# Patient Record
Sex: MALE | HISPANIC OR LATINO | Employment: FULL TIME | URBAN - METROPOLITAN AREA
[De-identification: names, ages, dates, MRNs, and addresses within clinical notes are randomized per-mention and may not be internally consistent; named-entity substitution may affect disease eponyms.]

---
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
## 2022-11-08 ENCOUNTER — OFFICE VISIT (OUTPATIENT)
Dept: DERMATOLOGY | Facility: IMAGING CENTER | Age: 22
End: 2022-11-08
Payer: COMMERCIAL

## 2022-11-08 VITALS — BODY MASS INDEX: 21.86 KG/M2 | WEIGHT: 148 LBS

## 2022-11-08 DIAGNOSIS — Z79.899 HIGH RISK MEDICATION USE: ICD-10-CM

## 2022-11-08 DIAGNOSIS — L70.0 ACNE VULGARIS: ICD-10-CM

## 2022-11-08 PROCEDURE — 99214 OFFICE O/P EST MOD 30 MIN: CPT | Performed by: NURSE PRACTITIONER

## 2022-11-08 NOTE — PROGRESS NOTES
DERMATOLOGY NOTE  FOLLOW UP VISIT       Chief complaint: Establish Care and Acne     Pt states he is having random breakouts and would like to restart accutane.    No Known Allergies     MEDICATIONS:  Medications relevant to specialty reviewed.     REVIEW OF SYSTEMS:   Positive for skin (see HPI)  Negative for fevers and chills       EXAM:  Wt 67.1 kg (148 lb)   BMI 21.86 kg/m²   Constitutional: Well-developed, well-nourished, and in no distress.     A focused skin exam was performed including the affected areas of the face. Notable findings on exam today listed below and/or in assessment/plan.     several erythematous papules, few pustules, several open and closed comedones concentrated on lateral cheeks and chin    IMPRESSION / PLAN:    1. Acne vulgaris  Discussed risks, benefits, alternative treatments as well as common side effects associated with prescribed treatment, Patient verbalized understanding and agrees with plan regarding Acne vulgaris - severe, inflammatory, scaring  - educated patient and parent about diagnosis, management options, and expectations of treatment  - given lack of response/clearance with several courses of oral antibiotics, they would like to pursue alternative treatment. I discussed options of isotretinoin vs oral contraceptive pills vs BBL/light therapy. Introductory packet provided for the family to review. I extensively discussed the potential adverse effects of treatment, including, but not limited to, teratogenicity, ocular disturbances, bony abnormailities, lipid disturbances, elevated LFTs/liver inflammation, reported associations with IBD, depression, endocrine disturbances, hematologic abnormalities, and myopathy. More common side effects of dry skin/eyes/mucosa, photosensitivity, sticky skin, hair loss, fragile nails, paronychia, myalgias, fatigue, headache, and abdominal pain/nausea/diarrhea were also reviewed.  - Patient is aware he cannot share the medication, and he  cannot donate blood during treatment, and through one month after completion of treatment.  - we discussed the need for baseline blood work, and follow-up blood work once treatment is initiated    - pt aware to notify me asap if any new/concerning symptoms  - lubricating eye drops, nasal spray, vaseline    Once pt has had baseline labs will initiate month one at daily dosing with repeat labs before visit  - dose: isotretinoin tentatively 30 mg daily-- will plan to increase to twice daily dosing month 2  - ipledge ID: [id number]  Goal dose: 120-150mg/kg; 67.1 kg = 8052 mg-21564 mg; total cumulative dose on day of evaluation: 0mg    2. High risk medication use    - Lipid Profile; Future  - ASPARTATE AMINO-GARCIA; Future  - ALANINE AMINO-TRANS; Future                Please note that this dictation was created using voice recognition software. I have made every reasonable attempt to correct obvious errors, but I expect that there are errors of grammar and possibly content that I did not discover before finalizing the note.    Return to clinic in: Return for 1 month after initiating month one. and as needed for any new or changing skin lesions.

## 2022-11-10 ENCOUNTER — HOSPITAL ENCOUNTER (OUTPATIENT)
Dept: LAB | Facility: MEDICAL CENTER | Age: 22
End: 2022-11-10
Attending: NURSE PRACTITIONER
Payer: COMMERCIAL

## 2022-11-10 DIAGNOSIS — Z79.899 HIGH RISK MEDICATION USE: ICD-10-CM

## 2022-11-10 LAB
ALT SERPL-CCNC: 16 U/L (ref 2–50)
AST SERPL-CCNC: 13 U/L (ref 12–45)

## 2022-11-10 PROCEDURE — 84460 ALANINE AMINO (ALT) (SGPT): CPT

## 2022-11-10 PROCEDURE — 36415 COLL VENOUS BLD VENIPUNCTURE: CPT

## 2022-11-10 PROCEDURE — 84450 TRANSFERASE (AST) (SGOT): CPT

## 2022-11-14 ENCOUNTER — HOSPITAL ENCOUNTER (OUTPATIENT)
Dept: LAB | Facility: MEDICAL CENTER | Age: 22
End: 2022-11-14
Attending: NURSE PRACTITIONER
Payer: COMMERCIAL

## 2022-11-14 ENCOUNTER — TELEPHONE (OUTPATIENT)
Dept: DERMATOLOGY | Facility: IMAGING CENTER | Age: 22
End: 2022-11-14
Payer: COMMERCIAL

## 2022-11-14 DIAGNOSIS — Z79.899 HIGH RISK MEDICATION USE: ICD-10-CM

## 2022-11-14 LAB
CHOLEST SERPL-MCNC: 96 MG/DL (ref 100–199)
FASTING STATUS PATIENT QL REPORTED: NORMAL
HDLC SERPL-MCNC: 52 MG/DL
LDLC SERPL CALC-MCNC: 37 MG/DL
TRIGL SERPL-MCNC: 33 MG/DL (ref 0–149)

## 2022-11-14 PROCEDURE — 36415 COLL VENOUS BLD VENIPUNCTURE: CPT

## 2022-11-14 PROCEDURE — 80061 LIPID PANEL: CPT

## 2022-11-14 NOTE — TELEPHONE ENCOUNTER
Please let pt know liver labs were normal, but for some reason the lipid profile was not done, let him know this needs to be done before I will call in Accutane  Thank you

## 2022-11-15 DIAGNOSIS — L70.0 ACNE VULGARIS: ICD-10-CM

## 2022-11-15 RX ORDER — ISOTRETINOIN 30 MG/1
1 CAPSULE ORAL DAILY
Qty: 30 CAPSULE | Refills: 0 | Status: SHIPPED | OUTPATIENT
Start: 2022-11-15 | End: 2022-12-08 | Stop reason: SDUPTHER

## 2022-12-08 ENCOUNTER — OFFICE VISIT (OUTPATIENT)
Dept: DERMATOLOGY | Facility: IMAGING CENTER | Age: 22
End: 2022-12-08
Payer: COMMERCIAL

## 2022-12-08 DIAGNOSIS — Z79.899 HIGH RISK MEDICATION USE: ICD-10-CM

## 2022-12-08 DIAGNOSIS — L70.0 ACNE VULGARIS: ICD-10-CM

## 2022-12-08 PROCEDURE — 99213 OFFICE O/P EST LOW 20 MIN: CPT | Performed by: NURSE PRACTITIONER

## 2022-12-08 RX ORDER — ISOTRETINOIN 30 MG/1
1 CAPSULE ORAL 2 TIMES DAILY
Qty: 60 CAPSULE | Refills: 0 | Status: SHIPPED | OUTPATIENT
Start: 2022-12-08 | End: 2023-01-19 | Stop reason: SDUPTHER

## 2022-12-08 NOTE — PROGRESS NOTES
DERMATOLOGY NOTE  FOLLOW UP VISIT       Chief complaint: Follow-Up     Pt stating he is tolerating accutane well  1 month labs pending, baseline labs WNL, has nearly completed month 1, will increase next month dose to BID    ROS:   Headaches? No  Dizziness? No  Changes in vision? No  Blurry or double vision? No  Dry Lips? Yes  Dry eyes? Yes  Dry nose? Yes  Epistaxis? Yes  Joint pain? No  Stomach upset or abdominal cramping? No  Diarrhea or blood in stool? No  Sadness? No  Thoughts of harming self or others?  No          No Known Allergies     MEDICATIONS:  Medications relevant to specialty reviewed.     REVIEW OF SYSTEMS:   Positive for skin (see HPI)  Negative for fevers and chills     EXAM:  There were no vitals taken for this visit.  Constitutional: Well-developed, well-nourished, and in no distress.     A focused skin exam was performed including the affected areas of the face. Notable findings on exam today listed below and/or in assessment/plan.     Fewer erythematous papules, few pustules, fewer open and closed comedones concentrated on lateral cheeks and chin    IMPRESSION / PLAN:    1. Acne vulgaris  Re-discussed risks, benefits, alternative treatments as well as common side effects associated with prescribed treatment, Patient verbalized understanding and agrees with plan regarding Acne vulgaris - severe, inflammatory, scaring  - re-educated patient and parent about diagnosis, management options, and expectations of treatment  - given lack of response/clearance with several courses of oral antibiotics, they would like to pursue alternative treatment. I re-discussed options of isotretinoin vs oral contraceptive pills vs BBL/light therapy. Introductory packet provided for the family to review. I extensively discussed the potential adverse effects of treatment, including, but not limited to, teratogenicity, ocular disturbances, bony abnormailities, lipid disturbances, elevated LFTs/liver inflammation,  reported associations with IBD, depression, endocrine disturbances, hematologic abnormalities, and myopathy. More common side effects of dry skin/eyes/mucosa, photosensitivity, sticky skin, hair loss, fragile nails, paronychia, myalgias, fatigue, headache, and abdominal pain/nausea/diarrhea were also reviewed.  - Patient is aware he cannot share the medication, and he cannot donate blood during treatment, and through one month after completion of treatment.  - we discussed the need for baseline blood work, and follow-up blood work once treatment is initiated    - pt aware to notify me asap if any new/concerning symptoms  - lubricating eye drops, nasal spray, vaseline    Once pt has had baseline labs will initiate month one at daily dosing with repeat labs before visit  - dose: isotretinoin tentatively 30 mg BID  - ipledge ID: [id number]  Goal dose: 120-150mg/kg; 67.1 kg = 8052 mg-65170 mg; total cumulative dose on day of evaluation: 900mg    2. High risk medication use    - Lipid Profile; Future  - ASPARTATE AMINO-GARCIA; Future  - ALANINE AMINO-TRANS; Future    Please note that this dictation was created using voice recognition software. I have made every reasonable attempt to correct obvious errors, but I expect that there are errors of grammar and possibly content that I did not discover before finalizing the note.    Return to clinic in: Return in about 1 month (around 1/8/2023) for acne, Accutane follow up. and as needed for any new or changing skin lesions.

## 2023-01-11 ENCOUNTER — APPOINTMENT (OUTPATIENT)
Dept: DERMATOLOGY | Facility: IMAGING CENTER | Age: 23
End: 2023-01-11
Payer: COMMERCIAL

## 2023-01-17 ENCOUNTER — OFFICE VISIT (OUTPATIENT)
Dept: DERMATOLOGY | Facility: IMAGING CENTER | Age: 23
End: 2023-01-17
Payer: COMMERCIAL

## 2023-01-17 DIAGNOSIS — L70.0 ACNE VULGARIS: ICD-10-CM

## 2023-01-17 PROCEDURE — 99213 OFFICE O/P EST LOW 20 MIN: CPT | Performed by: NURSE PRACTITIONER

## 2023-01-17 NOTE — PROGRESS NOTES
DERMATOLOGY NOTE  FOLLOW UP VISIT       Chief complaint: Follow-Up       Pt stating he is tolerating accutane well--nearly completed month 2  Baseline labs WNL, but did not have labs done after month 1 of therapy. Pt understands will not refill medication until labs are completed, will have done tomorrow    ROS:   Headaches? No  Dizziness? No  Changes in vision? No  Blurry or double vision? No  Dry Lips? Yes  Dry eyes? Yes  Dry nose? Yes  Epistaxis? Yes  Joint pain? No  Stomach upset or abdominal cramping? No  Diarrhea or blood in stool? No  Sadness? No  Thoughts of harming self or others?  No          No Known Allergies     MEDICATIONS:  Medications relevant to specialty reviewed.     REVIEW OF SYSTEMS:   Positive for skin (see HPI)  Negative for fevers and chills     EXAM:  There were no vitals taken for this visit.  Constitutional: Well-developed, well-nourished, and in no distress.     A focused skin exam was performed including the affected areas of the face. Notable findings on exam today listed below and/or in assessment/plan.     Very few erythematous papules, zero pustules, few open and closed comedones concentrated on lateral cheeks and chin    IMPRESSION / PLAN:    1. Acne vulgaris  Re-discussed risks, benefits, alternative treatments as well as common side effects associated with prescribed treatment, Patient verbalized understanding and agrees with plan regarding Acne vulgaris - severe, inflammatory, scaring  - re-educated patient and parent about diagnosis, management options, and expectations of treatment  - given lack of response/clearance with several courses of oral antibiotics, they would like to pursue alternative treatment. I re-discussed options of isotretinoin vs oral contraceptive pills vs BBL/light therapy. Introductory packet provided for the family to review. I extensively discussed the potential adverse effects of treatment, including, but not limited to, teratogenicity, ocular  disturbances, bony abnormailities, lipid disturbances, elevated LFTs/liver inflammation, reported associations with IBD, depression, endocrine disturbances, hematologic abnormalities, and myopathy. More common side effects of dry skin/eyes/mucosa, photosensitivity, sticky skin, hair loss, fragile nails, paronychia, myalgias, fatigue, headache, and abdominal pain/nausea/diarrhea were also reviewed.  - Patient is aware he cannot share the medication, and he cannot donate blood during treatment, and through one month after completion of treatment.  - we discussed the need for baseline blood work, and follow-up blood work once treatment is initiated    - pt aware to notify me asap if any new/concerning symptoms  - lubricating eye drops, nasal spray, vaseline    Once pt has had baseline labs will initiate month one at daily dosing with repeat labs before visit  - dose: isotretinoin  30 mg BID  - ipledge ID: [id number]  Goal dose: 120-150mg/kg; 67.1 kg = 8052 mg-78532 mg; total cumulative dose on day of evaluation: 2700 mg    Rx to be called in once labs completed and reviewed  Follow up in 1 month      Please note that this dictation was created using voice recognition software. I have made every reasonable attempt to correct obvious errors, but I expect that there are errors of grammar and possibly content that I did not discover before finalizing the note.    Return to clinic in: Return in about 1 month (around 2/17/2023) for 1 month Accutane follow up. and as needed for any new or changing skin lesions.

## 2023-01-18 ENCOUNTER — HOSPITAL ENCOUNTER (OUTPATIENT)
Dept: LAB | Facility: MEDICAL CENTER | Age: 23
End: 2023-01-18
Attending: NURSE PRACTITIONER
Payer: COMMERCIAL

## 2023-01-18 DIAGNOSIS — Z79.899 HIGH RISK MEDICATION USE: ICD-10-CM

## 2023-01-18 LAB
ALT SERPL-CCNC: 68 U/L (ref 2–50)
AST SERPL-CCNC: 32 U/L (ref 12–45)
CHOLEST SERPL-MCNC: 110 MG/DL (ref 100–199)
FASTING STATUS PATIENT QL REPORTED: NORMAL
HDLC SERPL-MCNC: 52 MG/DL
LDLC SERPL CALC-MCNC: 49 MG/DL
TRIGL SERPL-MCNC: 43 MG/DL (ref 0–149)

## 2023-01-18 PROCEDURE — 84460 ALANINE AMINO (ALT) (SGPT): CPT

## 2023-01-18 PROCEDURE — 80061 LIPID PANEL: CPT

## 2023-01-18 PROCEDURE — 84450 TRANSFERASE (AST) (SGOT): CPT

## 2023-01-18 PROCEDURE — 36415 COLL VENOUS BLD VENIPUNCTURE: CPT

## 2023-01-19 DIAGNOSIS — L70.0 ACNE VULGARIS: ICD-10-CM

## 2023-01-19 RX ORDER — ISOTRETINOIN 30 MG/1
1 CAPSULE ORAL 2 TIMES DAILY
Qty: 60 CAPSULE | Refills: 0 | Status: SHIPPED | OUTPATIENT
Start: 2023-01-19 | End: 2023-02-02 | Stop reason: SDUPTHER

## 2023-02-02 DIAGNOSIS — L70.0 ACNE VULGARIS: ICD-10-CM

## 2023-02-02 RX ORDER — ISOTRETINOIN 30 MG/1
1 CAPSULE ORAL 2 TIMES DAILY
Qty: 60 CAPSULE | Refills: 0 | Status: SHIPPED | OUTPATIENT
Start: 2023-02-02 | End: 2023-03-07 | Stop reason: SDUPTHER

## 2023-03-07 ENCOUNTER — OFFICE VISIT (OUTPATIENT)
Dept: DERMATOLOGY | Facility: IMAGING CENTER | Age: 23
End: 2023-03-07
Payer: COMMERCIAL

## 2023-03-07 DIAGNOSIS — Z79.899 HIGH RISK MEDICATION USE: ICD-10-CM

## 2023-03-07 DIAGNOSIS — L70.0 ACNE VULGARIS: ICD-10-CM

## 2023-03-07 PROCEDURE — 99213 OFFICE O/P EST LOW 20 MIN: CPT | Performed by: NURSE PRACTITIONER

## 2023-03-07 RX ORDER — ISOTRETINOIN 30 MG/1
1 CAPSULE ORAL 2 TIMES DAILY
Qty: 60 CAPSULE | Refills: 0 | Status: SHIPPED | OUTPATIENT
Start: 2023-03-07

## 2023-03-07 NOTE — PROGRESS NOTES
DERMATOLOGY NOTE  FOLLOW UP VISIT       Chief complaint: Acne and Follow-Up  Pt states he hasn't had any new breakouts and only side effects is dry skin.        PREV VISIT 01/17/2023   Pt stating he is tolerating accutane well--nearly completed month 3  Baseline labs WNL, but did not have labs done after month 1 of therapy. Pt understands will not refill medication until labs are completed, will have done tomorrow    ROS:   Headaches? No  Dizziness? No  Changes in vision? No  Blurry or double vision? No  Dry Lips? Yes  Dry eyes? Yes  Dry nose? Yes  Epistaxis? Yes  Joint pain? No  Stomach upset or abdominal cramping? No  Diarrhea or blood in stool? No  Sadness? No  Thoughts of harming self or others?  No          No Known Allergies     MEDICATIONS:  Medications relevant to specialty reviewed.     REVIEW OF SYSTEMS:   Positive for skin (see HPI)  Negative for fevers and chills     EXAM:  There were no vitals taken for this visit.  Constitutional: Well-developed, well-nourished, and in no distress.     A focused skin exam was performed including the affected areas of the face. Notable findings on exam today listed below and/or in assessment/plan.     Very few erythematous papules, zero pustules, few open and closed comedones concentrated on lateral cheeks and chin    IMPRESSION / PLAN:    1. Acne vulgaris  Re-discussed risks, benefits, alternative treatments as well as common side effects associated with prescribed treatment, Patient verbalized understanding and agrees with plan regarding Acne vulgaris - severe, inflammatory, scaring  - re-educated patient and parent about diagnosis, management options, and expectations of treatment  - given lack of response/clearance with several courses of oral antibiotics, they would like to pursue alternative treatment. I re-discussed options of isotretinoin vs oral contraceptive pills vs BBL/light therapy. Introductory packet provided for the family to review. I extensively  discussed the potential adverse effects of treatment, including, but not limited to, teratogenicity, ocular disturbances, bony abnormailities, lipid disturbances, elevated LFTs/liver inflammation, reported associations with IBD, depression, endocrine disturbances, hematologic abnormalities, and myopathy. More common side effects of dry skin/eyes/mucosa, photosensitivity, sticky skin, hair loss, fragile nails, paronychia, myalgias, fatigue, headache, and abdominal pain/nausea/diarrhea were also reviewed.  - Patient is aware he cannot share the medication, and he cannot donate blood during treatment, and through one month after completion of treatment.  - we discussed the need for baseline blood work, and follow-up blood work once treatment is initiated    - pt aware to notify me asap if any new/concerning symptoms  - lubricating eye drops, nasal spray, vaseline    Once pt has had baseline labs will initiate month one at daily dosing with repeat labs before visit  - dose: isotretinoin  30 mg BID  - ipledge ID: [id number]  Goal dose: 120-150mg/kg; 67.1 kg = 8052 mg-99566 mg; total cumulative dose on day of evaluation: 4500 mg        - Isotretinoin 30 MG Cap; Take 1 Capsule by mouth 2 times a day.  Dispense: 60 Capsule; Refill: 0    2. High risk medication use    - Lipid Profile; Future  - ASPARTATE AMINO-GARCIA; Future  - ALANINE AMINO-TRANS; Future      Please note that this dictation was created using voice recognition software. I have made every reasonable attempt to correct obvious errors, but I expect that there are errors of grammar and possibly content that I did not discover before finalizing the note.    Return to clinic in: Return in about 1 month (around 4/7/2023) for 1 month Accutane follow up. and as needed for any new or changing skin lesions.

## 2023-04-07 ENCOUNTER — HOSPITAL ENCOUNTER (OUTPATIENT)
Dept: LAB | Facility: MEDICAL CENTER | Age: 23
End: 2023-04-07
Attending: NURSE PRACTITIONER
Payer: COMMERCIAL

## 2023-04-07 DIAGNOSIS — Z79.899 HIGH RISK MEDICATION USE: ICD-10-CM

## 2023-04-07 PROCEDURE — 36415 COLL VENOUS BLD VENIPUNCTURE: CPT

## 2023-04-07 PROCEDURE — 84460 ALANINE AMINO (ALT) (SGPT): CPT

## 2023-04-07 PROCEDURE — 80061 LIPID PANEL: CPT

## 2023-04-07 PROCEDURE — 84450 TRANSFERASE (AST) (SGOT): CPT

## 2023-04-08 LAB
ALT SERPL-CCNC: 21 U/L (ref 2–50)
AST SERPL-CCNC: 29 U/L (ref 12–45)
CHOLEST SERPL-MCNC: 115 MG/DL (ref 100–199)
HDLC SERPL-MCNC: 45 MG/DL
LDLC SERPL CALC-MCNC: 57 MG/DL
TRIGL SERPL-MCNC: 66 MG/DL (ref 0–149)

## 2023-04-10 ENCOUNTER — APPOINTMENT (OUTPATIENT)
Dept: DERMATOLOGY | Facility: IMAGING CENTER | Age: 23
End: 2023-04-10
Payer: COMMERCIAL

## 2023-04-17 ENCOUNTER — APPOINTMENT (OUTPATIENT)
Dept: DERMATOLOGY | Facility: IMAGING CENTER | Age: 23
End: 2023-04-17
Payer: COMMERCIAL

## 2023-04-22 ENCOUNTER — HOSPITAL ENCOUNTER (OUTPATIENT)
Dept: LAB | Facility: MEDICAL CENTER | Age: 23
End: 2023-04-22
Attending: NURSE PRACTITIONER
Payer: COMMERCIAL

## 2023-04-22 LAB
CHOLEST SERPL-MCNC: 119 MG/DL (ref 100–199)
FASTING STATUS PATIENT QL REPORTED: NORMAL
HDLC SERPL-MCNC: 60 MG/DL
LDLC SERPL CALC-MCNC: 51 MG/DL
TRIGL SERPL-MCNC: 38 MG/DL (ref 0–149)

## 2023-04-22 PROCEDURE — 36415 COLL VENOUS BLD VENIPUNCTURE: CPT

## 2023-04-22 PROCEDURE — 80061 LIPID PANEL: CPT

## 2024-01-05 NOTE — TELEPHONE ENCOUNTER
I spoke with patient today regarding his positive Covid test.  Patient verbalized understanding that his test is positive and that he needs to continue to quarantine at home until his 10-day quarantine is concluded in his symptoms of fever have been gone for at least 24 to 48 hours.    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  I will make a note of the same information any each of their charts.   Medication(s) Requested: tadalafil 2.5 MG tablet,ALPRAZolam (XANAX) 0.5 MG tablet ,zolpidem (AMBIEN) 10 MG tablet  Last office visit: 12/19/23  Last refill: 12/12/23.  Is the patient due for refill of this medication(s): Yes  PDMP review: Criteria met. Forwarded to Physician/MAURICIO for signature.